# Patient Record
Sex: FEMALE | URBAN - METROPOLITAN AREA
[De-identification: names, ages, dates, MRNs, and addresses within clinical notes are randomized per-mention and may not be internally consistent; named-entity substitution may affect disease eponyms.]

---

## 2018-06-26 ENCOUNTER — TELEPHONE (OUTPATIENT)
Dept: PSYCHIATRY | Facility: CLINIC | Age: 58
End: 2018-06-26

## 2018-06-26 NOTE — TELEPHONE ENCOUNTER
Received results of CMP, A1C, Lipid Panel and CBC from [facility] drawn on 6/22/2018 at 11:58am  Results entered into EMR by Aziza Mclean/EMILY on 6/26/2018  Routed to Dr. Jimenez and Becky Ozuna RNCC for review  Document placed in scanning and a copy held in Psychiatry until scanning complete/confirmed Aziza Rinaldi

## 2018-06-28 NOTE — PROGRESS NOTES
Order(s) created erroneously. Erroneous order ID: [id number]   Order canceled by: RAINA SAWYER   Order cancel date/time: 06/28/2018 10:06 AM

## 2018-06-28 NOTE — PROGRESS NOTES
Order(s) created erroneously. Erroneous order ID: [id number]   Order canceled by: RAINA SAWYER   Order cancel date/time: 06/28/2018 10:06 AM

## 2018-06-28 NOTE — PROGRESS NOTES
Order(s) created erroneously. Erroneous order ID: [id number]   Order canceled by: RAINA SAWYER   Order cancel date/time: 06/28/2018 10:05 AM

## 2018-06-28 NOTE — PROGRESS NOTES
Order(s) created erroneously. Erroneous order ID: [id number]   Order canceled by: RAINA SAWYER   Order cancel date/time: 06/28/2018 10:06 AM

## 2021-05-25 ENCOUNTER — RECORDS - HEALTHEAST (OUTPATIENT)
Dept: ADMINISTRATIVE | Facility: CLINIC | Age: 61
End: 2021-05-25

## 2021-05-26 ENCOUNTER — RECORDS - HEALTHEAST (OUTPATIENT)
Dept: ADMINISTRATIVE | Facility: CLINIC | Age: 61
End: 2021-05-26